# Patient Record
Sex: MALE | Race: WHITE
[De-identification: names, ages, dates, MRNs, and addresses within clinical notes are randomized per-mention and may not be internally consistent; named-entity substitution may affect disease eponyms.]

---

## 2020-10-05 ENCOUNTER — HOSPITAL ENCOUNTER (EMERGENCY)
Dept: HOSPITAL 88 - ER | Age: 55
Discharge: LEFT BEFORE DISCHARGE | End: 2020-10-05
Payer: SELF-PAY

## 2020-10-05 DIAGNOSIS — R06.02: Primary | ICD-10-CM

## 2020-10-05 NOTE — XMS REPORT
Continuity of Care Document

                             Created on: 10/05/2020



ESTHER SAMUELS

External Reference #: [id number]

: 1965

Sex: Male



Demographics





                          Address                   [address] [place]  [address]

 

                          Home Phone                [phone]

 

                          Preferred Language        English

 

                          Marital Status            Unknown

 

                          Sabianist Affiliation     Unknown

 

                          Race                      Unknown

 

                          Ethnic Group              Unknown





Author





                          Author                    [facility]

t

 

                          Organization              [facility]

 

                          Address                   [address]

[place]  [address]



 

                          Phone                     Unavailable







Support





                Name            Relationship    Address         Phone

 

                    NANCY MILTON       PRS                 [address] [place]  [address]                     [phone]

 

                    NANCY MILTON       PRS                 [address] [place], [place]  [id number] [phone]







Care Team Providers





                    Care Team Member Name Role                Phone

 

                          Unavailable               Unavailable







Payers





           Payer Name Policy Type Policy Number Effective Date Expiration Date S

ource







Problems

This patient has no known problems.



Allergies, Adverse Reactions, Alerts





        Allergy Name Allergy Type Status  Severity Reaction(s) Onset Date Inacti

ve Date 

Treating Clinician        Comments                  Source

 

       penicillin G DA     Active SV            2015 00:00:00             

         [facility]







Medications

This patient has no known medications.



Procedures

This patient has no known procedures.



Results





           Test Description Test Time  Test Comments Results    Result Comments 

Source

 

                - XR CHEST 1 V  2020-10-05 14:47:00                   Name: ESTHER BERGERON                    

[facility]    : 1965 Age/S:55  /M            [address] 
[facility]           Unit#:[id number]     Loc: [place]
x [phone]               Phys: Marci Ward MD                              
              Acct#: [id number] Dis Date:                   PHONE #: 
[phone]      Status: PRE ER                                   FAX #: 
[phone]      Exam Date: 10/05/2020           Reason: SOB                   
                             EXAMS:                                             
 CPT CODE:      [id number] XR CHEST 1 V                               [id number]      
             HISTORY: Shortness of breath.               COMPARISON: None 
available.               Location: [facility].               Diffuse nodular bilateral 
alveolar infiltrates, greater on the right.       No effusion or congestion. 
Cardiac silhouette is mildly enlarged.                 IMPRESSION:              
    Diffuse bilateral nodular alveolar infiltrates, greater on the right.       
  ** Electronically Signed by NOA Bazzi on 10/05/2020 at 1447 **        
             Reported and signed by: Braeden Bazzi M.D.                         
   CC: Marci Ward MD                                                   
                                                              Technologist: 
Jude Gee RT(R)(CT)                            Trnscrpt Data: 10/05/2020
(1445) [email or web address]                           PAGE  1                       Signed 
Report

## 2023-01-06 ENCOUNTER — HOSPITAL ENCOUNTER (OUTPATIENT)
Dept: HOSPITAL 88 - ER | Age: 58
Setting detail: OBSERVATION
LOS: 1 days | Discharge: LEFT BEFORE DISCHARGE | End: 2023-01-07
Attending: FAMILY MEDICINE | Admitting: FAMILY MEDICINE
Payer: COMMERCIAL

## 2023-01-06 VITALS — WEIGHT: 265 LBS | HEIGHT: 75 IN | BODY MASS INDEX: 32.95 KG/M2

## 2023-01-06 VITALS — SYSTOLIC BLOOD PRESSURE: 134 MMHG | DIASTOLIC BLOOD PRESSURE: 74 MMHG

## 2023-01-06 VITALS — DIASTOLIC BLOOD PRESSURE: 73 MMHG | SYSTOLIC BLOOD PRESSURE: 121 MMHG

## 2023-01-06 DIAGNOSIS — Z53.29: ICD-10-CM

## 2023-01-06 DIAGNOSIS — E78.5: ICD-10-CM

## 2023-01-06 DIAGNOSIS — I25.2: ICD-10-CM

## 2023-01-06 DIAGNOSIS — R07.9: ICD-10-CM

## 2023-01-06 DIAGNOSIS — I25.10: ICD-10-CM

## 2023-01-06 DIAGNOSIS — Z79.899: ICD-10-CM

## 2023-01-06 DIAGNOSIS — I50.23: ICD-10-CM

## 2023-01-06 DIAGNOSIS — Z95.810: ICD-10-CM

## 2023-01-06 DIAGNOSIS — Z88.0: ICD-10-CM

## 2023-01-06 DIAGNOSIS — Z20.822: ICD-10-CM

## 2023-01-06 DIAGNOSIS — J20.9: Primary | ICD-10-CM

## 2023-01-06 DIAGNOSIS — I11.0: ICD-10-CM

## 2023-01-06 LAB
ALBUMIN SERPL-MCNC: 3.7 G/DL (ref 3.5–5)
ALBUMIN/GLOB SERPL: 1.3 {RATIO} (ref 0.8–2)
ALP SERPL-CCNC: 67 IU/L (ref 40–150)
ALT SERPL-CCNC: 45 IU/L (ref 0–55)
ANION GAP SERPL CALC-SCNC: 14.3 MMOL/L (ref 8–16)
BASOPHILS # BLD AUTO: 0 10*3/UL (ref 0–0.1)
BASOPHILS NFR BLD AUTO: 0.5 % (ref 0–1)
BUN SERPL-MCNC: 14 MG/DL (ref 7–26)
BUN/CREAT SERPL: 17 (ref 6–25)
CALCIUM SERPL-MCNC: 9.4 MG/DL (ref 8.4–10.2)
CHLORIDE SERPL-SCNC: 107 MMOL/L (ref 98–107)
CK MB SERPL-MCNC: 1.9 NG/ML (ref 0–5)
CK MB SERPL-MCNC: 2.3 NG/ML (ref 0–5)
CK SERPL-CCNC: 70 IU/L (ref 30–200)
CK SERPL-CCNC: 77 IU/L (ref 30–200)
CO2 SERPL-SCNC: 23 MMOL/L (ref 22–29)
DEPRECATED NEUTROPHILS # BLD AUTO: 3.8 10*3/UL (ref 2.1–6.9)
EOSINOPHIL # BLD AUTO: 0.3 10*3/UL (ref 0–0.4)
EOSINOPHIL NFR BLD AUTO: 4.1 % (ref 0–6)
ERYTHROCYTE [DISTWIDTH] IN CORD BLOOD: 11.5 % (ref 11.7–14.4)
GLOBULIN PLAS-MCNC: 2.8 G/DL (ref 2.3–3.5)
GLUCOSE SERPLBLD-MCNC: 107 MG/DL (ref 74–118)
HCT VFR BLD AUTO: 45.9 % (ref 38.2–49.6)
HGB BLD-MCNC: 14.4 G/DL (ref 14–18)
LYMPHOCYTES # BLD: 2.4 10*3/UL (ref 1–3.2)
LYMPHOCYTES NFR BLD AUTO: 32.1 % (ref 18–39.1)
MCH RBC QN AUTO: 33.1 PG (ref 28–32)
MCHC RBC AUTO-ENTMCNC: 31.4 G/DL (ref 31–35)
MCV RBC AUTO: 105.5 FL (ref 81–99)
MONOCYTES # BLD AUTO: 0.9 10*3/UL (ref 0.2–0.8)
MONOCYTES NFR BLD AUTO: 11.8 % (ref 4.4–11.3)
NEUTS SEG NFR BLD AUTO: 51.2 % (ref 38.7–80)
PLATELET # BLD AUTO: 223 X10E3/UL (ref 140–360)
POTASSIUM SERPL-SCNC: 4.3 MMOL/L (ref 3.5–5.1)
RBC # BLD AUTO: 4.35 X10E6/UL (ref 4.3–5.7)
SODIUM SERPL-SCNC: 140 MMOL/L (ref 136–145)

## 2023-01-06 PROCEDURE — 84484 ASSAY OF TROPONIN QUANT: CPT

## 2023-01-06 PROCEDURE — 94640 AIRWAY INHALATION TREATMENT: CPT

## 2023-01-06 PROCEDURE — 36415 COLL VENOUS BLD VENIPUNCTURE: CPT

## 2023-01-06 PROCEDURE — 85379 FIBRIN DEGRADATION QUANT: CPT

## 2023-01-06 PROCEDURE — 71045 X-RAY EXAM CHEST 1 VIEW: CPT

## 2023-01-06 PROCEDURE — 83880 ASSAY OF NATRIURETIC PEPTIDE: CPT

## 2023-01-06 PROCEDURE — 80053 COMPREHEN METABOLIC PANEL: CPT

## 2023-01-06 PROCEDURE — 82550 ASSAY OF CK (CPK): CPT

## 2023-01-06 PROCEDURE — 82553 CREATINE MB FRACTION: CPT

## 2023-01-06 PROCEDURE — 85025 COMPLETE CBC W/AUTO DIFF WBC: CPT

## 2023-01-06 PROCEDURE — 99284 EMERGENCY DEPT VISIT MOD MDM: CPT

## 2023-01-06 PROCEDURE — 94799 UNLISTED PULMONARY SVC/PX: CPT

## 2023-01-06 PROCEDURE — 93005 ELECTROCARDIOGRAM TRACING: CPT

## 2023-01-06 RX ADMIN — Medication SCH ML: at 19:35

## 2023-01-06 RX ADMIN — Medication PRN MG: at 20:00

## 2023-01-06 RX ADMIN — Medication SCH ML: at 14:15

## 2023-01-06 RX ADMIN — FUROSEMIDE SCH MG: 10 INJECTION, SOLUTION INTRAMUSCULAR; INTRAVENOUS at 20:00

## 2023-01-06 RX ADMIN — Medication SCH ML: at 23:00

## 2023-01-07 VITALS — SYSTOLIC BLOOD PRESSURE: 105 MMHG | DIASTOLIC BLOOD PRESSURE: 67 MMHG

## 2023-01-07 VITALS — DIASTOLIC BLOOD PRESSURE: 67 MMHG | SYSTOLIC BLOOD PRESSURE: 105 MMHG

## 2023-01-07 VITALS — DIASTOLIC BLOOD PRESSURE: 62 MMHG | SYSTOLIC BLOOD PRESSURE: 122 MMHG

## 2023-01-07 VITALS — SYSTOLIC BLOOD PRESSURE: 108 MMHG | DIASTOLIC BLOOD PRESSURE: 74 MMHG

## 2023-01-07 LAB
CK MB SERPL-MCNC: 2.2 NG/ML (ref 0–5)
CK SERPL-CCNC: 67 IU/L (ref 30–200)

## 2023-01-07 RX ADMIN — Medication SCH ML: at 07:00

## 2023-01-07 RX ADMIN — Medication PRN MG: at 04:43

## 2023-01-07 RX ADMIN — Medication SCH ML: at 03:00

## 2023-01-07 RX ADMIN — FUROSEMIDE SCH MG: 10 INJECTION, SOLUTION INTRAMUSCULAR; INTRAVENOUS at 09:30

## 2024-07-25 ENCOUNTER — OFFICE VISIT (OUTPATIENT)
Dept: URBAN - METROPOLITAN AREA CLINIC 70 | Facility: CLINIC | Age: 59
End: 2024-07-25

## 2024-07-29 ENCOUNTER — OFFICE VISIT (OUTPATIENT)
Dept: URBAN - METROPOLITAN AREA CLINIC 70 | Facility: CLINIC | Age: 59
End: 2024-07-29

## 2024-11-22 ENCOUNTER — DASHBOARD ENCOUNTERS (OUTPATIENT)
Age: 59
End: 2024-11-22

## 2024-11-22 ENCOUNTER — LAB OUTSIDE AN ENCOUNTER (OUTPATIENT)
Dept: URBAN - METROPOLITAN AREA CLINIC 70 | Facility: CLINIC | Age: 59
End: 2024-11-22

## 2024-11-22 ENCOUNTER — OFFICE VISIT (OUTPATIENT)
Dept: URBAN - METROPOLITAN AREA CLINIC 70 | Facility: CLINIC | Age: 59
End: 2024-11-22
Payer: MEDICAID

## 2024-11-22 VITALS
HEART RATE: 81 BPM | DIASTOLIC BLOOD PRESSURE: 75 MMHG | BODY MASS INDEX: 38.67 KG/M2 | TEMPERATURE: 98.1 F | WEIGHT: 291.8 LBS | HEIGHT: 73 IN | SYSTOLIC BLOOD PRESSURE: 117 MMHG

## 2024-11-22 DIAGNOSIS — R19.5 POSITIVE COLORECTAL CANCER SCREENING USING COLOGUARD TEST: ICD-10-CM

## 2024-11-22 DIAGNOSIS — Z12.11 COLON CANCER SCREENING: ICD-10-CM

## 2024-11-22 PROCEDURE — 99203 OFFICE O/P NEW LOW 30 MIN: CPT | Performed by: REGISTERED NURSE

## 2024-11-22 RX ORDER — METOPROLOL SUCCINATE 100 MG/1
TAKE 1 TABLET BY MOUTH EVERY DAY TABLET, EXTENDED RELEASE ORAL
Qty: 30 EACH | Refills: 0 | Status: ACTIVE | COMMUNITY

## 2024-11-22 RX ORDER — LISINOPRIL 10 MG/1
TAKE 1 TABLET BY MOUTH EVERY DAY TABLET ORAL
Qty: 30 EACH | Refills: 0 | Status: ACTIVE | COMMUNITY

## 2024-11-22 RX ORDER — ATORVASTATIN CALCIUM 40 MG/1
TAKE 1 TABLET BY MOUTH EVERY DAY TABLET, FILM COATED ORAL
Qty: 30 EACH | Refills: 0 | Status: ACTIVE | COMMUNITY

## 2024-11-22 RX ORDER — BUMETANIDE 2 MG/1
TAKE 1 TABLET BY MOUTH EVERY DAY TABLET ORAL
Qty: 30 EACH | Refills: 0 | Status: ACTIVE | COMMUNITY

## 2024-11-22 RX ORDER — POTASSIUM CHLORIDE 750 MG/1
TAKE 1 CAPSULE BY MOUTH EVERY DAY CAPSULE, EXTENDED RELEASE ORAL
Qty: 30 EACH | Refills: 0 | Status: ACTIVE | COMMUNITY

## 2024-11-22 RX ORDER — FUROSEMIDE 40 MG/1
TAKE 1 TABLET BY MOUTH EVERY DAY TABLET ORAL
Qty: 30 EACH | Refills: 0 | Status: ACTIVE | COMMUNITY

## 2024-11-22 NOTE — HPI-TODAY'S VISIT:
Pt referred for positive ColoGuard test. They have never had a colonoscopy. Pt currently denies abdominal pain, diarrhea, constipation, rectal bleeding or weight loss.